# Patient Record
(demographics unavailable — no encounter records)

---

## 2025-04-18 NOTE — DISCUSSION/SUMMARY
[Low acute suicide risk] : Low acute suicide risk [No] : No [Not clinically indicated] : Safety Plan completed/updated (for individuals at risk): Not clinically indicated [FreeTextEntry1] : Risk factors: anxiety symptoms, not in treatment, family history of psychiatric illness.   Protective factors: female gender, domiciled with supportive family, engaged in school, no prior SA or SIB, not current si/i/p, no h/o violence, no current hi/i/p, help-seeking, motivated for outpatient treatment, future oriented with short and long term goals, barriers to suicide, no access to guns, not acutely manic or psychotic, no substance abuse, no trauma hx, no family history of suicide.

## 2025-04-18 NOTE — PHYSICAL EXAM
[Cooperative] : cooperative [Euthymic] : euthymic [Linear/Goal Directed] : linear/goal directed [WNL] : within normal limits [Average] : average [Normal] : normal [None] : none [Constricted] : constricted [Soft] : soft [Obsessional] : obsessional [Difficulty acknowledging presence of psychiatric problems] : Difficulty acknowledging presence of psychiatric problems [Moderate] : moderate [Lack of spontaneity] : lack of spontaneity [Unremarkable/age appropriate] : unremarkable/age appropriate [FreeTextEntry1] : Mildly unkempt

## 2025-04-18 NOTE — RISK ASSESSMENT
[Clinical Interview] : Clinical Interview [Collateral Sources] : Collateral Sources [No known suicide factors] : No known suicide factors [None known] : None known [Identifies reasons for living] : identifies reasons for living [Supportive social network of family or friends] : supportive social network of family or friends [Ability to cope with stress] : ability to cope with stress [Responsibility to children, family, or others] : responsibility to children, family, or others [Engaged in work or school] : engaged in work or school [None in the patient's lifetime] : None in the patient's lifetime [None Known] : none known [No] : no [No known risk factors] : No known risk factors [Residential stability] : residential stability [Relationship stability] : relationship stability [Affective stability] : affective stability [Sobriety] : sobriety [de-identified] : No access to firearms or weapons. Discussed extensively about firearm safety and that all weapons needs to be put away and locked up so the child does not have access to it. Family demonstrated understanding and agreed to do so.

## 2025-04-18 NOTE — HISTORY OF PRESENT ILLNESS
[Not Applicable] : Not applicable [FreeTextEntry1] : Danielle Valenzuela is a 17 year-old female, domiciled with parents and sibling, enrolled in [employer], in the 12th grade regular education (accepted to [employer], a wait list for [place] and [place]), with no prior psychiatric history, no h/o outpatient treatment, no h/o psychiatric hospitalizations, no h/o of self-injury or suicide attempts, no h/o aggression/violence/legal issues, no CPS involvement, no substance use, no known trauma hx, no significant pmhx, now presenting accompanied by her mother as she was self-referred for help connecting to outpatient services for presenting OCD symptoms.   Patient is reporting obsessions, or intrusive or unwanted repetitive or persistent thoughts, or urges. Patient attempts to ignore, suppress, or neutralize these thoughts with another thought or behavior/compulsion. Patient reports compulsions such as rituals or rigid rules, repetitive behaviors of washing/ checking. Patient has a fear of germs or contamination.  Patient feels driven to perform these compulsions to reduce the distress triggered by an obsession. Patient identified that she needs to experience a feeling that something is clean before she utilizes it. She reported that often, she locks the door to her room so no one can contaminate it. She endorsed that she often needs to wash before going to bed while also having her clothes washed separately form others in the house. As a result, she endorsed feeling somewhat triggered in going to college to share a door room with another individual. As a result, patient reported that she was hoping to receive a letter today stating that she could have a singular room in college despite not being involved in outpatient treatment. Patient endorsed though being open to outpatient services and thus, speaking later to her treatment team regarding those accommodation. No SI/HI or any aggressive thoughts were reported. No substance usage was reported. No trauma or abuse history was reported.   On interview with mom, she reported symptoms concurrent with above.  She stated that patient's OCD like symptoms started approximately 2 years ago with initial preoccupation with hygiene and cleaning his as manifested by excessive handwashing and avoiding different forms of contamination such as avoiding to touch mom's cell phone.  Over time, patient developed other obsessive thoughts and behaviors such as refusing mom to touch her bedsheet due to fears of contamination.  Patient would throw a tantrum if mom touched her but she, either purposely or by accident.  Over the past 2 months, patient's OCD-like symptoms worsened to the point where she has been refusing to use public bathrooms due to fear of contamination.  She limits her water intake so she does not have to urinate in school.  Nowadays, she has also been washing herself after each time of using the bathroom no matter for urination or defecation.  Mom notes that she has been spending and excessive time attending to her behaviors.  In terms of personal hygiene, her room is extremely messy and her hygiene is disheveled.  Socially, patient has been isolated chronically at baseline stating that she does not find investing in friendships or worth it.  She continues to enjoy playing musical instruments and listening to music and watching her tape off vitals on YouTube.  Mom notes patient is rigid and stubborn thought process along with low emotional reaction and lack of motivation to improve.  Mom denies any acute safety concerns.  She has been attending school in all her classes, but is usually arrives at the last minute, likely due to her OCD behaviors.  No significant concerns for depression or generalized worry.  No prominent symptoms of PTSD, psychosis, or sarah.  No PMDD.  Mom also notes some disagreements regarding her sexuality and sexual preferences aside from today's main concern. [FreeTextEntry2] : No past psychiatric history.  [FreeTextEntry3] : No past psychiatric medications.

## 2025-04-18 NOTE — REASON FOR VISIT
[Behavioral Health Urgent Care Assessment] : a behavioral health urgent care assessment [Patient] : patient [Self] : alone [Mother] : with mother [TextBox_17] : for help connecting to outpatient services for presenting OCD symptoms.

## 2025-04-18 NOTE — PLAN
"Subjective   History was provided by the mother.  Ryleigh Grace Hogan is a 3 y.o. female who is brought in for this 3 year old well child visit.    Current Issues:  Current concerns include toilet training-crosses legs and tries to hold stool in.   Hearing or vision concerns? no  Dental care up to date? yes    Review of Nutrition, Elimination, and Sleep:  Current diet: adequate milk and table foods  Balanced diet? yes  Current stooling frequency: daily BM's, Miralax--1/4 cap daily  Toilet trained? No--making progress, wears pull-up; holds bowel movements and needs reminding to urinate  Sleep: intermittent naps, sleeps all night-in crib- will transition soon  Sleep concerns? no     Social Screening:  Current child-care arrangements:   three days a week, sitter's house two days a week or with grandmother  Parental coping and self-care: doing well; no concerns  Opportunities for peer interaction? yes -   Concerns regarding behavior with peers? no  Secondhand smoke exposure? no   Working smoke detectors? Yes  Working CO detectors? Yes    Development:  Social/emotional: Joins other children to play  Language: Conversational speech, narrates book, mostly understandable to strangers  Cognitive: Draws [place], listens to warnings  Physical: Dresses self, uses spoon and fork, manipulates small toys, runs, jumps, dances    Screening Questions  Patient has a dental home: yes    Objective   /70   Ht 0.991 m (3' 3\")   Wt 17 kg Comment: 37.4lb  BMI 17.29 kg/m²     Growth parameters are noted and are appropriate for age.  General:   alert and oriented, in no acute distress   Gait:   normal   Skin:   normal   Oral cavity:   lips, mucosa, and tongue normal; teeth and gums normal   Eyes:   sclerae white, pupils equal and reactive   Ears:   normal bilaterally   Neck:   no adenopathy   Lungs:  clear to auscultation bilaterally   Heart:   regular rate and rhythm, S1, S2 normal, no murmur, click, rub or gallop "   Abdomen:  soft, non-tender; bowel sounds normal; no masses, no organomegaly   :  normal female   Extremities:   extremities normal, warm and well-perfused; no cyanosis, clubbing, or edema   Neuro:  normal without focal findings and muscle tone and strength normal and symmetric     1. Encounter for routine child health examination without abnormal findings        2. Chronic constipation            Assessment/Plan   Healthy 3 y.o. female child. Will continue with miralax, discussed toilet training.  1. Anticipatory guidance discussed.  Gave handout on well-child issues at this age.  2.  Normal growth for age.  The patient was counseled regarding nutrition and physical activity.  3. Development: appropriate for age  4. Vaccines utd  5. Will continue with routine dental visits.   6. Vision screener results are normal.  7. Follow up in 1 year for next well child exam or sooner if concerns.         [Contact was Attempted] : contact was attempted [TextBox_9] : Referral to CBT and psychiatry.  [TextBox_26] : school consent declined.